# Patient Record
Sex: MALE | Race: ASIAN | NOT HISPANIC OR LATINO
[De-identification: names, ages, dates, MRNs, and addresses within clinical notes are randomized per-mention and may not be internally consistent; named-entity substitution may affect disease eponyms.]

---

## 2019-01-30 PROBLEM — Z00.00 ENCOUNTER FOR PREVENTIVE HEALTH EXAMINATION: Status: ACTIVE | Noted: 2019-01-30

## 2019-02-01 ENCOUNTER — APPOINTMENT (OUTPATIENT)
Dept: DERMATOLOGY | Facility: CLINIC | Age: 34
End: 2019-02-01
Payer: MEDICAID

## 2019-02-01 ENCOUNTER — LABORATORY RESULT (OUTPATIENT)
Age: 34
End: 2019-02-01

## 2019-02-01 DIAGNOSIS — D48.5 NEOPLASM OF UNCERTAIN BEHAVIOR OF SKIN: ICD-10-CM

## 2019-02-01 DIAGNOSIS — L29.3 ANOGENITAL PRURITUS, UNSPECIFIED: ICD-10-CM

## 2019-02-01 PROCEDURE — 11102 TANGNTL BX SKIN SINGLE LES: CPT

## 2019-02-01 PROCEDURE — 99203 OFFICE O/P NEW LOW 30 MIN: CPT | Mod: 25,GC

## 2019-02-01 RX ORDER — MUPIROCIN 20 MG/G
2 OINTMENT TOPICAL
Qty: 1 | Refills: 3 | Status: ACTIVE | COMMUNITY
Start: 2019-02-01 | End: 1900-01-01

## 2019-02-01 NOTE — PHYSICAL EXAM
[Alert] : alert [Oriented x 3] : ~L oriented x 3 [Well Nourished] : well nourished [Conjunctiva Non-injected] : conjunctiva non-injected [No Visual Lymphadenopathy] : no visual  lymphadenopathy [No Clubbing] : no clubbing [No Edema] : no edema [No Bromhidrosis] : no bromhidrosis [No Chromhidrosis] : no chromhidrosis

## 2019-02-07 ENCOUNTER — MESSAGE (OUTPATIENT)
Age: 34
End: 2019-02-07

## 2019-02-07 RX ORDER — HYDROCORTISONE 25 MG/G
2.5 OINTMENT TOPICAL
Qty: 1 | Refills: 1 | Status: ACTIVE | COMMUNITY
Start: 2019-02-07 | End: 1900-01-01

## 2020-11-10 ENCOUNTER — EMERGENCY (EMERGENCY)
Facility: HOSPITAL | Age: 35
LOS: 1 days | Discharge: HOME | End: 2020-11-10
Attending: EMERGENCY MEDICINE | Admitting: EMERGENCY MEDICINE
Payer: COMMERCIAL

## 2020-11-10 VITALS
HEART RATE: 95 BPM | WEIGHT: 149.91 LBS | RESPIRATION RATE: 19 BRPM | SYSTOLIC BLOOD PRESSURE: 139 MMHG | DIASTOLIC BLOOD PRESSURE: 95 MMHG | HEIGHT: 65 IN | TEMPERATURE: 99 F | OXYGEN SATURATION: 98 %

## 2020-11-10 PROCEDURE — 99283 EMERGENCY DEPT VISIT LOW MDM: CPT

## 2020-11-10 PROCEDURE — 99283 EMERGENCY DEPT VISIT LOW MDM: CPT | Mod: 25

## 2020-11-10 PROCEDURE — 73610 X-RAY EXAM OF ANKLE: CPT

## 2020-11-10 PROCEDURE — 73610 X-RAY EXAM OF ANKLE: CPT | Mod: 26,LT

## 2020-11-10 RX ORDER — IBUPROFEN 200 MG
600 TABLET ORAL ONCE
Refills: 0 | Status: COMPLETED | OUTPATIENT
Start: 2020-11-10 | End: 2020-11-10

## 2020-11-10 RX ADMIN — Medication 600 MILLIGRAM(S): at 22:28

## 2020-11-10 NOTE — ED PROVIDER NOTE - CARE PROVIDER_API CALL
Malik Pitts  [facility]TH[facility]  [address]  [place] [address]  Phone: [phone]  Fax: [phone]  Follow Up Time:

## 2020-11-10 NOTE — ED PROVIDER NOTE - PATIENT PORTAL LINK FT
You can access the FollowMyHealth Patient Portal offered by [facility] by registering at the following website: [email or web address]. By joining [facility]’s FollowMyHealth portal, you will also be able to view your health information using other applications (apps) compatible with our system.

## 2020-11-10 NOTE — ED PROVIDER NOTE - NSFOLLOWUPINSTRUCTIONS_ED_ALL_ED_FT
Swollen Joint    WHAT YOU NEED TO KNOW:    Joint swelling may occur in one or more joints. You may have other symptoms, such as pain, tenderness, or stiffness. A swollen joint may be caused by a variety of conditions such as arthritis, pseudogout, gout, tendinitis, or injury.    DISCHARGE INSTRUCTIONS:    Return to the emergency department if:   •You cannot move your joint at all.      •You have severe pain that does not get better with medicine.       Contact your healthcare provider if:   •You have a fever.       •You have redness or warmth over the joint.       •The swelling does not decrease with treatment.      •You have questions or concerns about your condition or care.      Medicines:   •NSAIDs, such as ibuprofen, help decrease swelling, pain, and fever. This medicine is available with or without a doctor's order. NSAIDs can cause stomach bleeding or kidney problems in certain people. If you take blood thinner medicine, always ask your healthcare provider if NSAIDs are safe for you. Always read the medicine label and follow directions.      •Take your medicine as directed. Contact your healthcare provider if you think your medicine is not helping or if you have side effects. Tell him of her if you are allergic to any medicine. Keep a list of the medicines, vitamins, and herbs you take. Include the amounts, and when and why you take them. Bring the list or the pill bottles to follow-up visits. Carry your medicine list with you in case of an emergency.      Follow up with your healthcare provider as directed: Write down your questions so you remember to ask them during your visits.     Self-care:   •Rest your swollen joint. Avoid activities that make the swelling or pain worse. You may need to avoid putting weight on your joint while you have pain. Crutches or a walker can be used to avoid putting weight on joints in your lower body.      •Apply ice on your swollen joint for 15 to 20 minutes every hour or as directed. Use an ice pack, or put crushed ice in a plastic bag. Cover it with a towel. Ice helps prevent tissue damage and decreases swelling and pain.      •Apply heat on your swollen joint for 20 to 30 minutes every 2 hours for as many days as directed. Heat helps decrease pain.      •Elevate your swollen joint above the level of your heart as often as you can. This will help decrease swelling and pain. Prop your joint on pillows or blankets to keep it elevated comfortably.

## 2020-11-10 NOTE — ED ADULT NURSE NOTE - OBJECTIVE STATEMENT
Pt. states he developed left ankle pain that started in the AM yesterday. Denies trauma. Left ankle noted with lateral swelling. +ROM noted noted of left ankle. Awaiting MD evaluation. NAD noted.

## 2020-11-10 NOTE — ED PROVIDER NOTE - OBJECTIVE STATEMENT
36yo male who presents with left ankle pain for 2 days. pt denies trauma or fall, c/o sudden onset of left ankle pain, diffuse, non radiating, no calf pain or swelling, no tingling or weakness pt states pain is worse with movement and walking, he did not take anything for the pain

## 2020-12-22 PROBLEM — Z78.9 OTHER SPECIFIED HEALTH STATUS: Chronic | Status: ACTIVE | Noted: 2020-11-10

## 2020-12-30 ENCOUNTER — APPOINTMENT (OUTPATIENT)
Dept: ORTHOPEDIC SURGERY | Facility: CLINIC | Age: 35
End: 2020-12-30
Payer: COMMERCIAL

## 2020-12-30 VITALS
HEART RATE: 86 BPM | HEIGHT: 65 IN | DIASTOLIC BLOOD PRESSURE: 77 MMHG | SYSTOLIC BLOOD PRESSURE: 133 MMHG | WEIGHT: 150 LBS | BODY MASS INDEX: 24.99 KG/M2

## 2020-12-30 DIAGNOSIS — Q68.8 OTHER SPECIFIED CONGENITAL MUSCULOSKELETAL DEFORMITIES: ICD-10-CM

## 2020-12-30 DIAGNOSIS — M62.89 OTHER SPECIFIED DISORDERS OF MUSCLE: ICD-10-CM

## 2020-12-30 DIAGNOSIS — R93.7 ABNORMAL FINDINGS ON DIAGNOSTIC IMAGING OF OTHER PARTS OF MUSCULOSKELETAL SYSTEM: ICD-10-CM

## 2020-12-30 PROCEDURE — 73610 X-RAY EXAM OF ANKLE: CPT | Mod: LT

## 2020-12-30 PROCEDURE — 99072 ADDL SUPL MATRL&STAF TM PHE: CPT

## 2020-12-30 PROCEDURE — 99203 OFFICE O/P NEW LOW 30 MIN: CPT

## 2021-01-01 PROBLEM — R93.7 BONE MARROW EDEMA: Status: ACTIVE | Noted: 2021-01-01

## 2021-01-01 PROBLEM — M62.89 TIGHTNESS OF BOTH GASTROCNEMIUS MUSCLES: Status: ACTIVE | Noted: 2020-12-30

## 2021-01-01 PROBLEM — Q68.8 OS TRIGONUM: Status: ACTIVE | Noted: 2020-12-30

## 2021-08-28 NOTE — ED PROVIDER NOTE - CARE PROVIDERS DIRECT ADDRESSES
[FreeTextEntry1] : Long discussion regarding all options and risks\par All lab values and imaging studies reviewed\par Discussed with Medicine \par Return in one year ,DirectAddress_Unknown

## 2023-12-31 NOTE — ED PROVIDER NOTE - CPE EDP [facility] NORM
- - - Your COVID test came back positive  Please take tylenol as needed for fevers and drink plenty of fluids  Please return to the emergency department if you have worsening difficulty breathing, have trouble taking a deep breath or further concerns  Please stay home until your are feeling better